# Patient Record
Sex: FEMALE | Race: WHITE | NOT HISPANIC OR LATINO | Employment: FULL TIME | URBAN - METROPOLITAN AREA
[De-identification: names, ages, dates, MRNs, and addresses within clinical notes are randomized per-mention and may not be internally consistent; named-entity substitution may affect disease eponyms.]

---

## 2021-04-29 DIAGNOSIS — O09.522 AMA (ADVANCED MATERNAL AGE) MULTIGRAVIDA 35+, SECOND TRIMESTER: Primary | ICD-10-CM

## 2021-05-03 ENCOUNTER — OFFICE VISIT (OUTPATIENT)
Dept: MATERNAL FETAL MEDICINE | Facility: CLINIC | Age: 36
End: 2021-05-03
Payer: COMMERCIAL

## 2021-05-03 VITALS
HEIGHT: 62 IN | WEIGHT: 177 LBS | BODY MASS INDEX: 32.57 KG/M2 | DIASTOLIC BLOOD PRESSURE: 76 MMHG | RESPIRATION RATE: 20 BRPM | HEART RATE: 90 BPM | OXYGEN SATURATION: 97 % | SYSTOLIC BLOOD PRESSURE: 124 MMHG

## 2021-05-03 DIAGNOSIS — O09.522 AMA (ADVANCED MATERNAL AGE) MULTIGRAVIDA 35+, SECOND TRIMESTER: ICD-10-CM

## 2021-05-03 PROCEDURE — 76811 OB US DETAILED SNGL FETUS: CPT | Mod: S$GLB,,, | Performed by: OBSTETRICS & GYNECOLOGY

## 2021-05-03 PROCEDURE — 3008F BODY MASS INDEX DOCD: CPT | Mod: CPTII,S$GLB,, | Performed by: OBSTETRICS & GYNECOLOGY

## 2021-05-03 PROCEDURE — 99204 PR OFFICE/OUTPT VISIT, NEW, LEVL IV, 45-59 MIN: ICD-10-PCS | Mod: 25,S$GLB,, | Performed by: OBSTETRICS & GYNECOLOGY

## 2021-05-03 PROCEDURE — 3008F PR BODY MASS INDEX (BMI) DOCUMENTED: ICD-10-PCS | Mod: CPTII,S$GLB,, | Performed by: OBSTETRICS & GYNECOLOGY

## 2021-05-03 PROCEDURE — 99204 OFFICE O/P NEW MOD 45 MIN: CPT | Mod: 25,S$GLB,, | Performed by: OBSTETRICS & GYNECOLOGY

## 2021-05-03 PROCEDURE — 76811 PR US, OB FETAL EVAL & EXAM, TRANSABDOM,FIRST GESTATION: ICD-10-PCS | Mod: S$GLB,,, | Performed by: OBSTETRICS & GYNECOLOGY

## 2023-07-13 DIAGNOSIS — O09.522 MULTIGRAVIDA OF ADVANCED MATERNAL AGE IN SECOND TRIMESTER: Primary | ICD-10-CM

## 2023-07-27 ENCOUNTER — OFFICE VISIT (OUTPATIENT)
Dept: MATERNAL FETAL MEDICINE | Facility: CLINIC | Age: 38
End: 2023-07-27
Payer: COMMERCIAL

## 2023-07-27 VITALS
WEIGHT: 208 LBS | SYSTOLIC BLOOD PRESSURE: 118 MMHG | RESPIRATION RATE: 20 BRPM | OXYGEN SATURATION: 99 % | DIASTOLIC BLOOD PRESSURE: 78 MMHG | BODY MASS INDEX: 38.04 KG/M2 | HEART RATE: 87 BPM

## 2023-07-27 DIAGNOSIS — O09.522 MULTIGRAVIDA OF ADVANCED MATERNAL AGE IN SECOND TRIMESTER: ICD-10-CM

## 2023-07-27 PROCEDURE — 3078F DIAST BP <80 MM HG: CPT | Mod: CPTII,S$GLB,, | Performed by: OBSTETRICS & GYNECOLOGY

## 2023-07-27 PROCEDURE — 3074F PR MOST RECENT SYSTOLIC BLOOD PRESSURE < 130 MM HG: ICD-10-PCS | Mod: CPTII,S$GLB,, | Performed by: OBSTETRICS & GYNECOLOGY

## 2023-07-27 PROCEDURE — 99214 PR OFFICE/OUTPT VISIT, EST, LEVL IV, 30-39 MIN: ICD-10-PCS | Mod: 25,S$GLB,, | Performed by: OBSTETRICS & GYNECOLOGY

## 2023-07-27 PROCEDURE — 76811 OB US DETAILED SNGL FETUS: CPT | Mod: S$GLB,,, | Performed by: OBSTETRICS & GYNECOLOGY

## 2023-07-27 PROCEDURE — 3008F BODY MASS INDEX DOCD: CPT | Mod: CPTII,S$GLB,, | Performed by: OBSTETRICS & GYNECOLOGY

## 2023-07-27 PROCEDURE — 3008F PR BODY MASS INDEX (BMI) DOCUMENTED: ICD-10-PCS | Mod: CPTII,S$GLB,, | Performed by: OBSTETRICS & GYNECOLOGY

## 2023-07-27 PROCEDURE — 3074F SYST BP LT 130 MM HG: CPT | Mod: CPTII,S$GLB,, | Performed by: OBSTETRICS & GYNECOLOGY

## 2023-07-27 PROCEDURE — 76811 PR US, OB FETAL EVAL & EXAM, TRANSABDOM,FIRST GESTATION: ICD-10-PCS | Mod: S$GLB,,, | Performed by: OBSTETRICS & GYNECOLOGY

## 2023-07-27 PROCEDURE — 3078F PR MOST RECENT DIASTOLIC BLOOD PRESSURE < 80 MM HG: ICD-10-PCS | Mod: CPTII,S$GLB,, | Performed by: OBSTETRICS & GYNECOLOGY

## 2023-07-27 PROCEDURE — 99214 OFFICE O/P EST MOD 30 MIN: CPT | Mod: 25,S$GLB,, | Performed by: OBSTETRICS & GYNECOLOGY

## 2023-07-27 RX ORDER — ASPIRIN 81 MG/1
TABLET ORAL
COMMUNITY
Start: 2023-06-06

## 2023-07-27 NOTE — PROGRESS NOTES
Libra is here for initial MFM consultation, referred by Dr. Bill for ASA with negative NIPT and msafp. She was seen in our clinic for her last pregnancy in May of 2021.    She is not yet feeling fetal movement.    Libra denies vaginal bleeding, loss of fluid, recurrent cramping.    She is taking ASA 81 mg PO daily as well as Prilosec 20 mg PO daily, and OTC supplements (Biotin, Omega 3, vitamins B12 and D3) along with her prenatal vitamin daily.      Vitals:    07/27/23 0917   BP: 118/78   Pulse: 87   Resp: 20   SpO2: 99%   Weight: 94.3 kg (208 lb)      BMI:                    38.04 kg/m^2

## 2023-07-27 NOTE — PROGRESS NOTES
Indication for consultation:  Intrauterine pregnancy at 20w5d  Advanced maternal age with low risk NIPT  Obesity class 1    Provider requesting consultation:  Dr. Bill    Dear Dr. Bill,    Thank you very much for your referral of Libra Caldwell who was seen for initial perinatology consultation and sonography today.  As you recall she is a 38 y.o.  at 20w5d here for consultation regarding advanced maternal age.  She has undergone genetic screening was cell free DNA that reported back as low risk along with AFP screening that was within normal limits.    PMH:  Obesity, GERD    Ob Hx:  Her G1 was in  where she underwent a vaginal delivery of a female  weighing 7 lb 11 oz at 40 weeks' gestation denies any complications in the pregnancy.  Her G-tube was in  where she underwent a vaginal delivery of a female  weighing 8 lb 3 oz at 40 weeks' gestation denies any complications with that pregnancy.  Her G3 was then in  where she underwent a vaginal delivery of a male  weighing 8 lb 12 oz at 41 weeks' gestation denies any complications in the pregnancy.    PSH:  Denies    Family hx:  Her brother has a history of spina bifida occulta.  No other family history of congenital anomalies or aneuploidy.    SOC:  Denies any alcohol, tobacco, illicit drug use    Medications:  Prenatal vitamin, ASA 81 mg, Prilosec 20 mg daily    Allergies:  Penicillins    Review of systems: The patient denies any vaginal bleeding, loss of fluid or contraction pain today.  Vitals:    23 0917   BP: 118/78   Pulse: 87   Resp: 20     Physical exam:  Gen: WDWN in NAD  HEENT: WNL  Abdomen: Soft, non-tender, non-distended, obese  Skin: No rash or jaundice  Extremities: Symmetric in size, no clubbing, cyanosis, or edema  Neuro: Grossly intact    Ultrasound:  Single intrauterine pregnancy measuring 20 weeks and 6 days with an estimated fetal weight of 382 g g.  This is consistent with a previously determined  ZAIRE.  The fetus is in the vertex presentation.  The placenta is anterior without any evidence of previa.  The amniotic fluid is slightly accelerated with a maximum vertical pocket of 8.1 cm.  Completion of the anatomical survey was performed and there are no structural anomalies or aneuploidy on ultrasound today.  She has undergone genetic screening with NIPT that was low risk.       Counseling:  Today's ultrasound was of reassurance there are no structural anomalies or aneuploidy markers seen on ultrasound today.  She was counseled on the difference between genetic screening versus genetic testing declines any further genetic testing in this pregnancy.    Assessment:  Intrauterine pregnancy at 20w5d  Advanced Maternal Age with low risk NIPT and reassuring ultrasound  Obesity class 1 with slightly accelerated amniotic fluid    Recommendations:   Patient was instructed to keep all of her upcoming appointments with you.  At this point in time no further follow-up is recommended in the Maternal-Fetal Medicine office if we can be any other assistance please feel free to refer back.  Given the slight increase in amniotic fluid at 8.1 cm along with her history of obesity I do recommend an early glucose tolerance test.  If that is within normal limits recommend repeating the between 24 and 28 weeks' gestation.  If she is found to have gestational diabetes please let our office know we would be happy to see her back.  At this point in time Libra was reassured with today's ultrasound does not desire any further genetic testing at this pregnancy which I feel is appropriate.    Thanks once again for allowing us to participate in the care of your patients.  If you have any questions about today's consultation feel free to contact me or my partners at [phone].    Sincerely,    Patricia Colon, DO  Maternal-Fetal Medicine     Total time personally involved in this patient's care was 60 minutes.

## 2023-07-27 NOTE — LETTER
July 27, 2023        Erica Bill MD  [address]  [facility] [address]             [facility]  [address]  [place] [address]  Phone: [phone]  Fax: [phone]   Patient: Libra Caldwell   MR Number: [id number]   YOB: 1985   Date of Visit: 7/27/2023       Dear Dr. Bill:    Thank you for referring Libra Caldwell to me for evaluation. Attached you will find relevant portions of my assessment and plan of care.    If you have questions, please do not hesitate to call me. I look forward to following Libra Caldwell along with you.    Sincerely,      Patricia Colon, DO            CC  No Recipients    Enclosure

## 2023-10-19 DIAGNOSIS — O24.410 DIET CONTROLLED GESTATIONAL DIABETES MELLITUS (GDM) IN THIRD TRIMESTER: Primary | ICD-10-CM

## 2023-10-30 ENCOUNTER — PROCEDURE VISIT (OUTPATIENT)
Dept: MATERNAL FETAL MEDICINE | Facility: CLINIC | Age: 38
End: 2023-10-30
Payer: COMMERCIAL

## 2023-10-30 VITALS
SYSTOLIC BLOOD PRESSURE: 128 MMHG | RESPIRATION RATE: 15 BRPM | BODY MASS INDEX: 39.75 KG/M2 | DIASTOLIC BLOOD PRESSURE: 64 MMHG | HEART RATE: 92 BPM | WEIGHT: 216 LBS | HEIGHT: 62 IN | OXYGEN SATURATION: 99 %

## 2023-10-30 DIAGNOSIS — O24.410 DIET CONTROLLED GESTATIONAL DIABETES MELLITUS (GDM) IN THIRD TRIMESTER: ICD-10-CM

## 2023-10-30 PROCEDURE — 99214 PR OFFICE/OUTPT VISIT, EST, LEVL IV, 30-39 MIN: ICD-10-PCS | Mod: 25,S$GLB,, | Performed by: OBSTETRICS & GYNECOLOGY

## 2023-10-30 PROCEDURE — 76816 OB US FOLLOW-UP PER FETUS: CPT | Mod: 26,S$GLB,, | Performed by: OBSTETRICS & GYNECOLOGY

## 2023-10-30 PROCEDURE — 99214 OFFICE O/P EST MOD 30 MIN: CPT | Mod: 25,S$GLB,, | Performed by: OBSTETRICS & GYNECOLOGY

## 2023-10-30 PROCEDURE — 76816 PR  US,PREGNANT UTERUS,F/U,TRANSABD APP: ICD-10-PCS | Mod: 26,S$GLB,, | Performed by: OBSTETRICS & GYNECOLOGY

## 2023-10-30 PROCEDURE — 76819 FETAL BIOPHYS PROFIL W/O NST: CPT | Mod: 26,S$GLB,, | Performed by: OBSTETRICS & GYNECOLOGY

## 2023-10-30 PROCEDURE — 76819 PR US, OB, FETAL BIOPHYSICAL, W/O NST: ICD-10-PCS | Mod: 26,S$GLB,, | Performed by: OBSTETRICS & GYNECOLOGY

## 2023-10-30 RX ORDER — METFORMIN HYDROCHLORIDE 500 MG/1
TABLET ORAL
COMMUNITY
Start: 2023-10-16

## 2023-10-30 RX ORDER — OMEPRAZOLE 20 MG/1
CAPSULE, DELAYED RELEASE ORAL
COMMUNITY

## 2023-10-30 RX ORDER — FOLIC ACID 1 MG/1
TABLET ORAL
COMMUNITY

## 2023-10-30 NOTE — PROGRESS NOTES
Indication for consultation:  Intrauterine pregnancy at 34w5d  Advanced maternal age with low risk NIPT  Obesity class 1  Newly Diagnosed Gestational Diabetes    Provider requesting consultation:  Dr. Bill    Dear Dr. Bill,    Thank you very much for your referral of Libra Caldwell who was seen for follow up perinatology consultation and sonography today.  As you recall she is a 38 y.o.  at 34w2d here for consultation regarding her new diagnosis of gestational diabetes.  She also recently had an ultrasound in your office where the head circumference was 1-2 weeks ahead. She was seen here early on in the pregnancy for her age and everything at that point in time was reassuring.  States that she has been checking her blood sugars and that her fastings usually range between 90s and 110s.  That her postprandials are less than 120.  She is currently on metformin 500 mg in the morning.      Review of systems: The patient denies any vaginal bleeding, loss of fluid or contraction pain today.  Vitals:    10/30/23 1259   BP: 128/64   Pulse: 92   Resp: 15     Physical exam:  Gen: WDWN in NAD  HEENT: WNL  Abdomen: Soft, non-tender, non-distended, obese  Skin: No rash or jaundice  Extremities: Symmetric in size, no clubbing, cyanosis, or edema  Neuro: Grossly intact    Ultrasound:  Single intrauterine pregnancy measuring 35 weeks and 6 days with an estimated fetal weight of 2646 g.  This is consistent with a previously determined ZAIRE and is at the 45th percentile.  The fetus is in the vertex presentation and the head circumference is within normal limits.  The placenta is anterior without any evidence of previa.  The amniotic fluid is normal with an NIGEL of 7.2.  Completion of the anatomical survey was performed and there are no structural anomalies or aneuploidy on ultrasound today.  She has undergone genetic screening with NIPT that was low risk.  Additionally, a BPP was performed that was .        Counseling:   Today's ultrasound was of reassurance.  I did let her know that the head circumference is overall within normal limits.  I did correlate this with some people where a small had versus some people where a medium or large at.  Structurally everything within the brain is within normal limits and again the head circumference here today was overall reassuring.  I also let her know that I feel that her fasting blood sugars are actually quite elevated and that I would like for her fastings to be less than 95.  I have asked her to start taking metformin a 1000 mg at bedtime as well.  Our goals in this pregnancy are for her fastings to be less than 95 and her 2 hour postprandials to be less than 120.    Assessment:  Intrauterine pregnancy at 34w2d  Advanced Maternal Age with low risk NIPT and reassuring ultrasound  Obesity class 1 with slightly accelerated amniotic fluid  Gestational Diabetes A2    Recommendations:   Patient was instructed to keep all of her upcoming appointments with you.  At this point in time no further follow-up is recommended in the Maternal-Fetal Medicine office if we can be any other assistance please feel free to refer back.  I have asked her to increase her metformin to 500 mg in the morning and a 1000 mg at bedtime.  I have also asked her to incorporate a bedtime snack.  I do recommend weekly  testing to be performed in your office between now and delivery and delivery recommendations as between 39 and 0/7 weeks to 39 and 6/7 weeks.  She also has a lifetime increased risk of diabetes mellitus type 2 and so therefore recommend a 2 hour glucose tolerance test to be performed between 4 and 12 weeks postpartum.    Thanks once again for allowing us to participate in the care of your patients.  If you have any questions about today's consultation feel free to contact me or my partners at [phone].    Sincerely,    Patricia Colon, DO  Maternal-Fetal Medicine     Total time personally  involved in this patient's care was 35 minutes.

## 2023-10-30 NOTE — LETTER
October 31, 2023        Erica Bill MD  [address]  [facility] [address]             [facility]  [address]  [place] [address]  Phone: [phone]  Fax: [phone]   Patient: Libra Caldwell   MR Number: [id number]   YOB: 1985   Date of Visit: 10/30/2023       Dear Dr. Bill:    Thank you for referring Libra Caldwell to me for evaluation. Below are the relevant portions of my assessment and plan of care.            If you have questions, please do not hesitate to call me. I look forward to following Libra along with you.    Sincerely,      Patricia Colon, DO           CC  No Recipients        Yes